# Patient Record
Sex: FEMALE | Race: WHITE | NOT HISPANIC OR LATINO
[De-identification: names, ages, dates, MRNs, and addresses within clinical notes are randomized per-mention and may not be internally consistent; named-entity substitution may affect disease eponyms.]

---

## 2022-12-22 ENCOUNTER — NON-APPOINTMENT (OUTPATIENT)
Age: 32
End: 2022-12-22

## 2022-12-22 PROBLEM — Z00.00 ENCOUNTER FOR PREVENTIVE HEALTH EXAMINATION: Status: ACTIVE | Noted: 2022-12-22

## 2022-12-23 ENCOUNTER — NON-APPOINTMENT (OUTPATIENT)
Age: 32
End: 2022-12-23

## 2023-01-13 ENCOUNTER — APPOINTMENT (OUTPATIENT)
Dept: OTOLARYNGOLOGY | Facility: CLINIC | Age: 33
End: 2023-01-13
Payer: COMMERCIAL

## 2023-01-13 VITALS
HEART RATE: 66 BPM | SYSTOLIC BLOOD PRESSURE: 120 MMHG | OXYGEN SATURATION: 100 % | RESPIRATION RATE: 16 BRPM | HEIGHT: 62 IN | TEMPERATURE: 98.6 F | DIASTOLIC BLOOD PRESSURE: 75 MMHG | BODY MASS INDEX: 23 KG/M2 | WEIGHT: 125 LBS

## 2023-01-13 DIAGNOSIS — Z78.9 OTHER SPECIFIED HEALTH STATUS: ICD-10-CM

## 2023-01-13 PROCEDURE — 99203 OFFICE O/P NEW LOW 30 MIN: CPT

## 2023-01-13 NOTE — ASSESSMENT
[FreeTextEntry1] : left 1.5 cm postauricular/infrauricular lesion, tender to palpation \par -MRI neck\par RTC with MRI neck to review findings --> pt may need FNA and further mgmt

## 2023-01-13 NOTE — PHYSICAL EXAM
[Midline] : trachea located in midline position [de-identified] : 1.5 cm l postauricular/infrauricular mass [Normal] : no nystagmus [de-identified] : gait steady

## 2023-01-13 NOTE — HISTORY OF PRESENT ILLNESS
[de-identified] : 33 y/o F is presenting with a mass behind her left ear. She she notes that it has been present for the past 5 years but for 5-6 mo she feels it is changing shape and is feeling more tenderness when pressing it -there is no pattern. She was not born with this. She points at tmj. She feels it is enlarged. She feels her hearing is good. No FH or SH pertinent to cc. Nonsmoker.

## 2023-01-27 ENCOUNTER — APPOINTMENT (OUTPATIENT)
Dept: OTOLARYNGOLOGY | Facility: CLINIC | Age: 33
End: 2023-01-27
Payer: COMMERCIAL

## 2023-01-27 VITALS
WEIGHT: 120 LBS | DIASTOLIC BLOOD PRESSURE: 76 MMHG | HEIGHT: 62 IN | TEMPERATURE: 98.2 F | BODY MASS INDEX: 22.08 KG/M2 | SYSTOLIC BLOOD PRESSURE: 114 MMHG | HEART RATE: 68 BPM | OXYGEN SATURATION: 97 %

## 2023-01-27 PROCEDURE — 99213 OFFICE O/P EST LOW 20 MIN: CPT

## 2023-01-28 NOTE — HISTORY OF PRESENT ILLNESS
[de-identified] : 2 week follow up appt for this 34 yo f with 1.5 cm lesion under and behind her left auricle. She estimates that it has been present for years but larger and more tender for 5-6 mo. She had mri and is here to review.

## 2023-01-28 NOTE — ASSESSMENT
[FreeTextEntry1] : left parotid tumor\par explained lesion and usual management\par Discussed with Dr Grimm - she agreed to see pt for this issue\par refer Dr Grimm - appt was made thru her office; pt was fully agreeable to this.\par follow up anytime if needed

## 2023-01-28 NOTE — REASON FOR VISIT
[Subsequent Evaluation] : a subsequent evaluation for [FreeTextEntry2] : l postero/infra-auricular lesion

## 2023-01-28 NOTE — PHYSICAL EXAM
[de-identified] : l postauricular/infra-auricular lesion c/w parotid tumor [Normal] : no rashes [FreeTextEntry2] : as above, VII intact [de-identified] : gait steady

## 2023-02-03 ENCOUNTER — APPOINTMENT (OUTPATIENT)
Dept: OTOLARYNGOLOGY | Facility: CLINIC | Age: 33
End: 2023-02-03
Payer: COMMERCIAL

## 2023-02-03 ENCOUNTER — APPOINTMENT (OUTPATIENT)
Dept: OTOLARYNGOLOGY | Facility: CLINIC | Age: 33
End: 2023-02-03

## 2023-02-03 ENCOUNTER — RESULT REVIEW (OUTPATIENT)
Age: 33
End: 2023-02-03

## 2023-02-03 VITALS
TEMPERATURE: 97.7 F | HEART RATE: 63 BPM | SYSTOLIC BLOOD PRESSURE: 102 MMHG | DIASTOLIC BLOOD PRESSURE: 78 MMHG | HEIGHT: 62 IN | BODY MASS INDEX: 23.55 KG/M2 | WEIGHT: 128 LBS

## 2023-02-03 DIAGNOSIS — Z86.39 PERSONAL HISTORY OF OTHER ENDOCRINE, NUTRITIONAL AND METABOLIC DISEASE: ICD-10-CM

## 2023-02-03 DIAGNOSIS — H92.02 OTALGIA, LEFT EAR: ICD-10-CM

## 2023-02-03 DIAGNOSIS — Z91.010 ALLERGY TO PEANUTS: ICD-10-CM

## 2023-02-03 PROCEDURE — 88305 TISSUE EXAM BY PATHOLOGIST: CPT | Mod: 26

## 2023-02-03 PROCEDURE — 99214 OFFICE O/P EST MOD 30 MIN: CPT | Mod: 25

## 2023-02-03 PROCEDURE — 88173 CYTOPATH EVAL FNA REPORT: CPT | Mod: 26

## 2023-02-03 PROCEDURE — 10021 FNA BX W/O IMG GDN 1ST LES: CPT

## 2023-02-06 ENCOUNTER — OUTPATIENT (OUTPATIENT)
Dept: OUTPATIENT SERVICES | Facility: HOSPITAL | Age: 33
LOS: 1 days | End: 2023-02-06
Payer: COMMERCIAL

## 2023-02-06 DIAGNOSIS — D49.0 NEOPLASM OF UNSPECIFIED BEHAVIOR OF DIGESTIVE SYSTEM: ICD-10-CM

## 2023-02-06 PROCEDURE — 88305 TISSUE EXAM BY PATHOLOGIST: CPT

## 2023-02-06 PROCEDURE — 88173 CYTOPATH EVAL FNA REPORT: CPT

## 2023-02-08 LAB — NON-GYNECOLOGICAL CYTOLOGY STUDY: SIGNIFICANT CHANGE UP

## 2023-02-16 ENCOUNTER — APPOINTMENT (OUTPATIENT)
Dept: OTOLARYNGOLOGY | Facility: CLINIC | Age: 33
End: 2023-02-16
Payer: COMMERCIAL

## 2023-02-16 VITALS
HEART RATE: 80 BPM | HEIGHT: 62 IN | BODY MASS INDEX: 22.82 KG/M2 | WEIGHT: 124 LBS | DIASTOLIC BLOOD PRESSURE: 78 MMHG | SYSTOLIC BLOOD PRESSURE: 116 MMHG | TEMPERATURE: 97.5 F

## 2023-02-16 PROBLEM — H92.02 OTALGIA OF LEFT EAR: Status: RESOLVED | Noted: 2023-01-13 | Resolved: 2023-02-16

## 2023-02-16 PROBLEM — Z91.010 HISTORY OF PEANUT ALLERGY: Status: RESOLVED | Noted: 2023-02-16 | Resolved: 2023-02-16

## 2023-02-16 PROBLEM — Z86.39 HISTORY OF HYPOTHYROIDISM: Status: RESOLVED | Noted: 2023-02-16 | Resolved: 2023-02-16

## 2023-02-16 PROCEDURE — 99214 OFFICE O/P EST MOD 30 MIN: CPT

## 2023-02-16 RX ORDER — LEVOTHYROXINE SODIUM 88 UG/1
88 TABLET ORAL
Refills: 0 | Status: ACTIVE | COMMUNITY

## 2023-02-16 NOTE — ASSESSMENT
[FreeTextEntry1] : Ms. VAZQUEZ is a 33 year old woman who has an enlarging left tail of parotid mass that is causing discomfort.\par Facial nerve function is intact and symmetric.\par FNA of the mass was performed in office today.\par Based on the clinical exam and history, I favor a benign neoplasm, such as pleomorphic adenoma.\par \par I will contact her re biopsy results.

## 2023-02-16 NOTE — REVIEW OF SYSTEMS
[Patient Intake Form Reviewed] : Patient intake form was reviewed [As Noted in HPI] : as noted in HPI [Negative] : Heme/Lymph [de-identified] : peanut allergy [de-identified] : headache

## 2023-02-16 NOTE — HISTORY OF PRESENT ILLNESS
[de-identified] : Ms. VAZQUEZ is a 33 year old woman who was referred by Dr. Cleaning for left parotid mass.\par \par Had mass behind her left ear for many years and noted recent growth.  The mass now feels different, and she has discomfort when the area is pressed.  No facial pain/weakness, ear pain, other lumps in neck.\par She had MRI.  No biopsy done.\par Left lower gum bothering her.\par \par  \par MRI SOFT TISSUE NECK WITHOUT AND WITH CONTRAST (01-) at [facility]\par - COMPARISON:  None\par - T2 hyperintense enhancing exophytic left parotid mass along the superficial aspect of the left parotid gland extending to the medial aspect of the gland without extension into the deep lobe measures 2 cm TV x 1.9 cm CC x 1.6 cm AP. Signal wise this can be seen with pleomorphic adenoma, however, there are irregular cauliflower-like borders. Recommend direct sampling.\par - Mild hypertrophy of the palatine tonsils. \par - No significant abnormality of the thyroid gland.\par - Bilateral level 1 and level 2 lymph nodes are subcentimeter in size in the short axis demonstrate benign ovoid morphology and are likely reactive. Tiny bilateral periparotid lymph nodes are nonspecific without suspicious features. No suspicious lymphadenopathy.\par - Limited intracranial evaluation demonstrates no significant abnormality. The orbits are unremarkable. Minimal opacification of the right mastoid air cells.. Left apical nasal spur. Hypertrophic nasal turbinates. The normal nasal cycling is currently on the right side.\par IMPRESSION: \par 1.) T2 hyperintense enhancing exophytic left parotid mass along the superficial aspect of the left parotid gland extending to the medial aspect of the gland without extension into the deep lobe measures 2 cm TV x 1.9 cm CC x 1.6 cm AP. Signal wise this can be seen with pleomorphic adenoma, however, there are irregular cauliflower-like borders. Recommend direct sampling.\par 2.) No suspicious lymphadenopathy.\par 3.) Left apical nasal spur. \par (Images were reviewed.) \par

## 2023-02-16 NOTE — PHYSICAL EXAM
[Midline] : trachea located in midline position [Normal] : no rashes [FreeTextEntry1] : No hoarseness.  [de-identified] : 1.5 firm tender nodule behind the left ear lobule; overlying skin is normal. [de-identified] : Mass behind left ear lobe likely parotid.  Right parotid and bilateral submandibular glands normal. [de-identified] : Carotid pulses 2+ bilateral.

## 2023-02-16 NOTE — PROCEDURE
[FreeTextEntry1] : FNA left parotid mass [FreeTextEntry2] : Left parotid mass, growing. [FreeTextEntry3] : \par I discussed the risks, benefits and alternatives of fine needle aspiration biopsy. I explained the risks, including, but not limited to, bleeding and infection. Informed consent was obtained.  The skin overlying the left tail of parotid mass was wiped with rubbing alcohol.  Two sterile passes with 25 gauge needles were made through the neck mass. Samples were placed in Cytolyte solution. The specimen was sent to cytology for analysis.\par She tolerated the procedure well. There was minimal bleeding and swelling.

## 2023-04-11 NOTE — HISTORY OF PRESENT ILLNESS
[de-identified] : Ms. VAZQUEZ presents for follow up for left parotid mass. \par No change in size. \par FNA of the left parotid mass favors a benign salivary neoplasm.\par \par INITIAL VISIT 2/3/2023\par Ms. VAZQUEZ is a 33 year old woman who was referred by Dr. Cleaning for left parotid mass.\par Had mass behind her left ear for many years and noted recent growth. The mass now feels different, and she has discomfort when the area is pressed. No facial pain/weakness, ear pain, other lumps in neck.\par She had MRI. No biopsy done.\par Left lower gum bothering her.\par \par \par PATHOLOGY PAROTID, LEFT, FNA (02/3/2023), read at [facility] \par - NEOPLASTIC CELLS PRESENT\par - Salivary gland neoplasm with chondromyxoid stroma\par - NOTE: The aspirate shows clusters of ductal cells and few myoepithelial cells in the background of chondromyxoid stroma.  Cell block shows similar findings.  Pleomorphic adenoma is favored.\par \par  \par MRI SOFT TISSUE NECK WITHOUT AND WITH CONTRAST (01-) at [facility]\par - COMPARISON: None\par - T2 hyperintense enhancing exophytic left parotid mass along the superficial aspect of the left parotid gland extending to the medial aspect of the gland without extension into the deep lobe measures 2 cm TV x 1.9 cm CC x 1.6 cm AP. Signal wise this can be seen with pleomorphic adenoma, however, there are irregular cauliflower-like borders. Recommend direct sampling.\par - Mild hypertrophy of the palatine tonsils. \par - No significant abnormality of the thyroid gland.\par - Bilateral level 1 and level 2 lymph nodes are subcentimeter in size in the short axis demonstrate benign ovoid morphology and are likely reactive. Tiny bilateral periparotid lymph nodes are nonspecific without suspicious features. No suspicious lymphadenopathy.\par - Limited intracranial evaluation demonstrates no significant abnormality. The orbits are unremarkable. Minimal opacification of the right mastoid air cells.. Left apical nasal spur. Hypertrophic nasal turbinates. The normal nasal cycling is currently on the right side.\par IMPRESSION: \par 1.) T2 hyperintense enhancing exophytic left parotid mass along the superficial aspect of the left parotid gland extending to the medial aspect of the gland without extension into the deep lobe measures 2 cm TV x 1.9 cm CC x 1.6 cm AP. Signal wise this can be seen with pleomorphic adenoma, however, there are irregular cauliflower-like borders. Recommend direct sampling.\par 2.) No suspicious lymphadenopathy.\par 3.) Left apical nasal spur. \par \par \par

## 2023-04-11 NOTE — ASSESSMENT
[FreeTextEntry1] : Ms. VAZQUEZ has a 2 cm left tail of parotid mass that is most likely a pleomorphic adenoma, a benign neoplasm, on FNA. \par \par I explained to the patient that surgical excision is recommended treatment, to prevent continued growth and smaller risk of malignant transformation. \par The procedure, risks, benefits and alternatives were discussed with her.\par Risks include, but are not limited to bleeding, infection, facial nerve weakness or paralysis, earlobe numbness, salivoma, gustatory sweating and scar.  All questions were answered.\par Left parotidectomy with facial nerve dissection has been scheduled for May 12, 2023, at [facility].  \par Medical evaluation and clearance will be requested.\par \par

## 2023-04-11 NOTE — PHYSICAL EXAM
[de-identified] : 1.5 firm tender nodule behind the left ear lobule; overlying skin is normal. [Normal] : no neck adenopathy [de-identified] : CN7 function intact bilateral.

## 2023-07-29 PROBLEM — D21.9 BENIGN NEOPLASM OF CONNECTIVE AND OTHER SOFT TISSUE, UNSPECIFIED: Chronic | Status: ACTIVE | Noted: 2023-05-11

## 2023-08-04 NOTE — ASU PATIENT PROFILE, ADULT - FALL HARM RISK - PT AGE POPULATION HIDDEN
Per LOV 8/23/22:     \"Insulin 70/30   60  units with breakfast and   60 units with dinner. \"    Future Appointments   Date Time Provider Vernell Myers   3/7/2023  5:30 PM Krishna Pimentel MD [facility]     Refilled per protocol. Adult

## 2023-08-04 NOTE — ASU PATIENT PROFILE, ADULT - NSSUBSTANCEUSE_GEN_ALL_CORE_SD
never used Consent 1/Introductory Paragraph: The rationale for Mohs was explained to the patient and consent was obtained. The risks, benefits and alternatives to therapy were discussed in detail. Specifically, the risks of infection, scarring, bleeding, prolonged wound healing, incomplete removal, allergy to anesthesia, nerve injury and recurrence were addressed. Prior to the procedure, the treatment site was clearly identified and confirmed by the patient. All components of Universal Protocol/PAUSE Rule completed.

## 2023-08-04 NOTE — ASU PATIENT PROFILE, ADULT - NSICDXPASTMEDICALHX_GEN_ALL_CORE_FT
PAST MEDICAL HISTORY:  History of hernia surgery     Hypothyroid     Other benign neoplasm of connective and other soft tissue of other specified sites

## 2023-08-07 ENCOUNTER — APPOINTMENT (OUTPATIENT)
Dept: OTOLARYNGOLOGY | Facility: HOSPITAL | Age: 33
End: 2023-08-07

## 2023-08-07 ENCOUNTER — OUTPATIENT (OUTPATIENT)
Dept: OUTPATIENT SERVICES | Facility: HOSPITAL | Age: 33
LOS: 1 days | Discharge: HOME | End: 2023-08-07
Payer: COMMERCIAL

## 2023-08-07 ENCOUNTER — TRANSCRIPTION ENCOUNTER (OUTPATIENT)
Age: 33
End: 2023-08-07

## 2023-08-07 ENCOUNTER — RESULT REVIEW (OUTPATIENT)
Age: 33
End: 2023-08-07

## 2023-08-07 VITALS
HEIGHT: 62 IN | HEART RATE: 59 BPM | OXYGEN SATURATION: 100 % | TEMPERATURE: 97 F | RESPIRATION RATE: 16 BRPM | DIASTOLIC BLOOD PRESSURE: 72 MMHG | SYSTOLIC BLOOD PRESSURE: 115 MMHG | WEIGHT: 123.9 LBS

## 2023-08-07 VITALS
DIASTOLIC BLOOD PRESSURE: 56 MMHG | RESPIRATION RATE: 19 BRPM | OXYGEN SATURATION: 98 % | HEART RATE: 76 BPM | SYSTOLIC BLOOD PRESSURE: 100 MMHG

## 2023-08-07 DIAGNOSIS — Z98.890 OTHER SPECIFIED POSTPROCEDURAL STATES: Chronic | ICD-10-CM

## 2023-08-07 PROCEDURE — 42410 EXCISE PAROTID GLAND/LESION: CPT | Mod: LT

## 2023-08-07 PROCEDURE — C1889: CPT

## 2023-08-07 PROCEDURE — 88307 TISSUE EXAM BY PATHOLOGIST: CPT

## 2023-08-07 PROCEDURE — 88307 TISSUE EXAM BY PATHOLOGIST: CPT | Mod: 26

## 2023-08-07 PROCEDURE — 42410 EXCISE PAROTID GLAND/LESION: CPT | Mod: GC,LT

## 2023-08-07 DEVICE — VISTASEAL FIBRIN HUMAN 10ML: Type: IMPLANTABLE DEVICE | Status: FUNCTIONAL

## 2023-08-07 RX ORDER — ONDANSETRON 8 MG/1
4 TABLET, FILM COATED ORAL EVERY 6 HOURS
Refills: 0 | Status: DISCONTINUED | OUTPATIENT
Start: 2023-08-07 | End: 2023-08-07

## 2023-08-07 RX ORDER — ACETAMINOPHEN 500 MG
2 TABLET ORAL
Qty: 0 | Refills: 0 | DISCHARGE
Start: 2023-08-07

## 2023-08-07 RX ORDER — LEVOTHYROXINE SODIUM 125 MCG
1 TABLET ORAL
Refills: 0 | DISCHARGE

## 2023-08-07 RX ORDER — ACETAMINOPHEN 500 MG
650 TABLET ORAL EVERY 6 HOURS
Refills: 0 | Status: DISCONTINUED | OUTPATIENT
Start: 2023-08-07 | End: 2023-08-07

## 2023-08-07 RX ORDER — MORPHINE SULFATE 50 MG/1
4 CAPSULE, EXTENDED RELEASE ORAL
Refills: 0 | Status: DISCONTINUED | OUTPATIENT
Start: 2023-08-07 | End: 2023-08-07

## 2023-08-07 RX ORDER — CEPHALEXIN 500 MG
1 CAPSULE ORAL
Qty: 20 | Refills: 0
Start: 2023-08-07 | End: 2023-08-11

## 2023-08-07 NOTE — BRIEF OPERATIVE NOTE - NSICDXBRIEFPROCEDURE_GEN_ALL_CORE_FT
PROCEDURES:  Superficial parotidectomy 07-Aug-2023 15:03:53  Oswald Bran   PROCEDURES:  Superficial parotidectomy 07-Aug-2023 15:03:53 LEFT Oswald Bran

## 2023-08-07 NOTE — PRE-ANESTHESIA EVALUATION ADULT - NSANTHPMHFT_GEN_ALL_CORE
Cardiac: Denies HTN, HLD, MI/Angina/Heart failure, Arrhythmia, Murmur/Valvular Disorder. >4 METS  Pulmonary: Denies Asthma, COPD, JOSE  Renal: Denies kidney dysfunction  Hepatic: Denies liver dysfunction  Gastrointestinal: Denies GERD/IBS  Endocrine: Positive for hypothyroidism.  Denies DM.  Neurologic: Denies stroke/seizure disorder  Hematologic: Denies anemia, blood clotting disorder, blood thinning medication.    PSH: Hernia repair.

## 2023-08-07 NOTE — ASU DISCHARGE PLAN (ADULT/PEDIATRIC) - NS MD DC FALL RISK RISK
For information on Fall & Injury Prevention, visit: [email or web address] OR  [email or web address] OR  https://www.cdc.gov/steadi/patient.html

## 2023-08-07 NOTE — ASU DISCHARGE PLAN (ADULT/PEDIATRIC) - ASU DC SPECIAL INSTRUCTIONSFT
- Keep head wrap in place for 1 day; keep dry for 1 day; ok to remove head wrap in 1 day and leave steri strips in place  - Take Keflex 250mg every 6 hours for 5 days  - Take over the counter tylenol and motrin as needed for pain (alternate every 3 hours)  - Follow up with Dr. graf 8/16 at 10:15 AM  - Avoid heavy lifting and straining for the next week

## 2023-08-07 NOTE — ASU DISCHARGE PLAN (ADULT/PEDIATRIC) - CARE PROVIDER_API CALL
Le Grimm  Otolaryngology  [address] [place] [address], [address]  [place] [address]  Phone: [phone]  Fax: [phone]  Scheduled Appointment: 08/16/2023 10:15 AM

## 2023-08-07 NOTE — BRIEF OPERATIVE NOTE - NSICDXBRIEFPREOP_GEN_ALL_CORE_FT
PRE-OP DIAGNOSIS:  Parotid mass 07-Aug-2023 15:04:01  Oswald Bran   PRE-OP DIAGNOSIS:  Pleomorphic adenoma of parotid gland 08-Aug-2023 14:11:19  Le Grimm

## 2023-08-07 NOTE — BRIEF OPERATIVE NOTE - NSICDXBRIEFPOSTOP_GEN_ALL_CORE_FT
POST-OP DIAGNOSIS:  Parotid mass 07-Aug-2023 15:04:07  Oswald Bran   POST-OP DIAGNOSIS:  Pleomorphic adenoma of parotid gland 08-Aug-2023 14:11:35  Le Grimm

## 2023-08-08 ENCOUNTER — NON-APPOINTMENT (OUTPATIENT)
Age: 33
End: 2023-08-08

## 2023-08-15 LAB — SURGICAL PATHOLOGY STUDY: SIGNIFICANT CHANGE UP

## 2023-08-17 ENCOUNTER — APPOINTMENT (OUTPATIENT)
Dept: OTOLARYNGOLOGY | Facility: CLINIC | Age: 33
End: 2023-08-17
Payer: COMMERCIAL

## 2023-08-17 VITALS
TEMPERATURE: 97.8 F | SYSTOLIC BLOOD PRESSURE: 107 MMHG | BODY MASS INDEX: 22.86 KG/M2 | HEIGHT: 62 IN | WEIGHT: 124.2 LBS | DIASTOLIC BLOOD PRESSURE: 70 MMHG | HEART RATE: 58 BPM

## 2023-08-17 DIAGNOSIS — R22.1 LOCALIZED SWELLING, MASS AND LUMP, HEAD: ICD-10-CM

## 2023-08-17 DIAGNOSIS — D49.0 NEOPLASM OF UNSPECIFIED BEHAVIOR OF DIGESTIVE SYSTEM: ICD-10-CM

## 2023-08-17 DIAGNOSIS — R22.0 LOCALIZED SWELLING, MASS AND LUMP, HEAD: ICD-10-CM

## 2023-08-17 PROBLEM — E03.9 HYPOTHYROIDISM, UNSPECIFIED: Chronic | Status: ACTIVE | Noted: 2023-08-04

## 2023-08-17 PROBLEM — Z98.890 OTHER SPECIFIED POSTPROCEDURAL STATES: Chronic | Status: ACTIVE | Noted: 2023-08-04

## 2023-08-17 PROCEDURE — 99024 POSTOP FOLLOW-UP VISIT: CPT

## 2023-08-25 ENCOUNTER — APPOINTMENT (OUTPATIENT)
Dept: OTOLARYNGOLOGY | Facility: CLINIC | Age: 33
End: 2023-08-25
Payer: COMMERCIAL

## 2023-08-25 VITALS
HEIGHT: 62 IN | WEIGHT: 122 LBS | HEART RATE: 81 BPM | TEMPERATURE: 98.1 F | BODY MASS INDEX: 22.45 KG/M2 | DIASTOLIC BLOOD PRESSURE: 81 MMHG | SYSTOLIC BLOOD PRESSURE: 114 MMHG

## 2023-08-25 PROCEDURE — 99024 POSTOP FOLLOW-UP VISIT: CPT

## 2023-08-25 RX ORDER — AMOXICILLIN AND CLAVULANATE POTASSIUM 500; 125 MG/1; MG/1
500-125 TABLET, FILM COATED ORAL
Qty: 14 | Refills: 0 | Status: COMPLETED | COMMUNITY
Start: 2023-08-17 | End: 2023-08-25

## 2023-08-25 RX ORDER — FLUCONAZOLE 150 MG/1
150 TABLET ORAL
Qty: 2 | Refills: 0 | Status: COMPLETED | COMMUNITY
Start: 2023-08-17 | End: 2023-08-25

## 2023-08-25 RX ORDER — METHYLPREDNISOLONE 4 MG/1
4 TABLET ORAL
Qty: 1 | Refills: 0 | Status: COMPLETED | COMMUNITY
Start: 2023-08-17 | End: 2023-08-23

## 2023-08-29 NOTE — HISTORY OF PRESENT ILLNESS
[de-identified] : Ms. VAZQUEZ underwent excision of left tail of parotid tumor without facial nerve dissection on August 7, 2023, at [facility]. She went home on day of surgery.  She has some pain in the surgical site that worsens when chewing. Left earlobe is numb. She has had left ear clogging x the past few days.    PATHOLOGY (8/07/2023) Left parotidectomy: -   Pleomorphic adenoma, 1.8 cm -   Tumor focally extends up to medial edge of specimen

## 2023-08-29 NOTE — ASSESSMENT
[FreeTextEntry1] : Ms. VAZQUEZ is recovering after excision of left tail of parotid pleomorphic adenoma on August 7, 2023. Final path showed pleomorphic adenoma. Mild cellulitis of left earlobe and discomfort when opening mouth.  Augmentin and Medrol Dosepak  Return in 1 week

## 2023-08-29 NOTE — ASSESSMENT
[FreeTextEntry1] : Ms. VAZQUEZ is recovering well after excision of left tail of parotid pleomorphic adenoma on August 7, 2023. Left ear pain and clogging resolved after Augmentin and Medrol Dosepak.  PLAN - massage surgical site and apply emollients.   Silicone gel sheeting may also be effective in minimizing appearance of scar.  - may start wearing pierced earring.  NO travel restrictions.  Return in 3 months.  Fredrick order baseline US parotid at that time.

## 2023-08-29 NOTE — PHYSICAL EXAM
[Normal] : cranial nerves 2-12 intact [de-identified] : Left parotidectomy scar healing well after Steri Strips were removed.  Mild thickened soft tissue at end of scar in postauricular area.  Residual bits of Dermabond on the earlobe. [de-identified] : Left TM with resolving blood bits.

## 2023-08-29 NOTE — HISTORY OF PRESENT ILLNESS
[de-identified] : Ms. VAZQUEZ reports that left ear pain and clogging are gone.  Completed a course of Medrol and Augmentin for mild cellulitis around left earlobe. S/p excision of left tail of parotid tumor without facial nerve dissection on August 7, 2023.  PATHOLOGY (8/07/2023) Left parotidectomy: -   Pleomorphic adenoma, 1.8 cm -   Tumor focally extends up to medial edge of specimen

## 2023-08-29 NOTE — PHYSICAL EXAM
[de-identified] : Left parotidectomy site with mild edema, erythema and tenderness of the lobule.  Steri strips and Dermabond were left in place. [Normal] : external ears are normal bilaterally [de-identified] : Left TM with a little dried blood on TM.  Right TM normal. [de-identified] : Facial nerve function intact bilateral.

## 2023-12-01 ENCOUNTER — APPOINTMENT (OUTPATIENT)
Dept: OTOLARYNGOLOGY | Facility: CLINIC | Age: 33
End: 2023-12-01
Payer: COMMERCIAL

## 2023-12-01 VITALS
WEIGHT: 122 LBS | HEART RATE: 82 BPM | HEIGHT: 62 IN | SYSTOLIC BLOOD PRESSURE: 107 MMHG | DIASTOLIC BLOOD PRESSURE: 75 MMHG | TEMPERATURE: 97 F | BODY MASS INDEX: 22.45 KG/M2

## 2023-12-01 DIAGNOSIS — R20.0 ANESTHESIA OF SKIN: ICD-10-CM

## 2023-12-01 DIAGNOSIS — Z98.890 OTHER SPECIFIED POSTPROCEDURAL STATES: ICD-10-CM

## 2023-12-01 DIAGNOSIS — L91.0 HYPERTROPHIC SCAR: ICD-10-CM

## 2023-12-01 DIAGNOSIS — D11.0 BENIGN NEOPLASM OF PAROTID GLAND: ICD-10-CM

## 2023-12-01 DIAGNOSIS — R20.2 ANESTHESIA OF SKIN: ICD-10-CM

## 2023-12-01 PROCEDURE — 99213 OFFICE O/P EST LOW 20 MIN: CPT

## 2024-04-29 PROBLEM — Z98.890 HISTORY OF SUPERFICIAL PAROTIDECTOMY: Status: ACTIVE | Noted: 2023-08-17

## 2024-04-29 PROBLEM — L91.0 HYPERTROPHIC SCAR: Status: ACTIVE | Noted: 2024-04-29

## 2024-04-29 PROBLEM — R20.0 NUMBNESS OR TINGLING: Status: ACTIVE | Noted: 2024-04-29

## 2024-04-29 NOTE — HISTORY OF PRESENT ILLNESS
[de-identified] : Ms. VAZQUEZ feels some scar thickening still behind her left ear. Numb left ear lobe. No pain or lump in left parotid/neck S/p excision of left tail of parotid tumor without facial nerve dissection on August 7, 2023.   PATHOLOGY (8/07/2023) Left parotidectomy: - Pleomorphic adenoma, 1.8 cm - Tumor focally extends up to medial edge of specimen

## 2024-04-29 NOTE — PHYSICAL EXAM
[de-identified] : Left parotidectomy scar healing well.  Mild thickened soft tissue at end of scar in postauricular area.  No palpable mass in parotid bed or in neck. [Normal] : cranial nerves 2-12 intact

## 2024-04-29 NOTE — ASSESSMENT
[FreeTextEntry1] : Ms. VAZQUEZ has hypertrophic portion of parotidectomy scar.  S/p excision of pleomorphic adenoma. --> apply silicone get sheeting --> US neck for baseline postop exam   Telephone f/up

## 2024-11-22 ENCOUNTER — NON-APPOINTMENT (OUTPATIENT)
Age: 34
End: 2024-11-22

## (undated) DEVICE — SUT PLAIN GUT FAST ABSORBING 5-0 PC-1

## (undated) DEVICE — DRAPE MAGNETIC INSTRUMENT MEDIUM

## (undated) DEVICE — LIGASURE EXACT DISSECTOR

## (undated) DEVICE — SUT SILK 3-0 18" TIES

## (undated) DEVICE — PACK UPPER BODY

## (undated) DEVICE — PROBE PRASS SLIM MONOPOLAR STIMULATOR

## (undated) DEVICE — DRSG CHIN-NECK LARGE

## (undated) DEVICE — SUCTION YANKAUER BULBOUS TIP NO VENT

## (undated) DEVICE — BIPOLAR FORCEP SYMMETRY BAYONET STR 8.25" X 1.5MM (BLUE)

## (undated) DEVICE — DRSG TEGADERM 4X4.75"

## (undated) DEVICE — RUBBERBAND STRL LTX FR 200/CA 3X1/8IN

## (undated) DEVICE — DRAIN SILICONE ROUND 9FR

## (undated) DEVICE — STAPLER SKIN PROXIMATE

## (undated) DEVICE — SUT SILK 2-0 30" PSL

## (undated) DEVICE — DRAPE TOWEL BLUE 17" X 24"

## (undated) DEVICE — SUT CHROMIC 3-0 27" SH

## (undated) DEVICE — DRSG TEGADERM 2.5X3"

## (undated) DEVICE — MARKING PEN DEVON DUAL TIP W RULER

## (undated) DEVICE — SPONGE PEANUT AUTO COUNT

## (undated) DEVICE — DRSG DERMABOND 0.7ML

## (undated) DEVICE — SUT CHROMIC 4-0 27" SH-1

## (undated) DEVICE — GLV 6.5 DERMAPRENE ULTRA